# Patient Record
Sex: FEMALE | Race: BLACK OR AFRICAN AMERICAN | NOT HISPANIC OR LATINO | URBAN - METROPOLITAN AREA
[De-identification: names, ages, dates, MRNs, and addresses within clinical notes are randomized per-mention and may not be internally consistent; named-entity substitution may affect disease eponyms.]

---

## 2020-04-07 ENCOUNTER — EMERGENCY (EMERGENCY)
Facility: HOSPITAL | Age: 60
LOS: 1 days | End: 2020-04-07

## 2020-09-23 NOTE — ED ADULT TRIAGE NOTE - MEANS OF ARRIVAL
Worry: Not on file     Inability: Not on file    Transportation needs     Medical: Not on file     Non-medical: Not on file   Tobacco Use    Smoking status: Never Smoker    Smokeless tobacco: Never Used   Substance and Sexual Activity    Alcohol use: No     Alcohol/week: 0.0 standard drinks    Drug use: No    Sexual activity: Never   Lifestyle    Physical activity     Days per week: Not on file     Minutes per session: Not on file    Stress: Not on file   Relationships    Social connections     Talks on phone: Not on file     Gets together: Not on file     Attends [employer] service: Not on file     Active member of club or organization: Not on file     Attends meetings of clubs or organizations: Not on file     Relationship status: Not on file    Intimate partner violence     Fear of current or ex partner: Not on file     Emotionally abused: Not on file     Physically abused: Not on file     Forced sexual activity: Not on file   Other Topics Concern    Not on file   Social History Narrative    Not on file       Family History   Problem Relation Age of Onset    Breast Cancer Mother         triple negative    Diabetes Father     Asthma Brother        Allergies:  Aspirin and Sulfa antibiotics    Home Medications:  Prior to Admission medications    Medication Sig Start Date End Date Taking?  Authorizing Provider   cephALEXin (KEFLEX) 500 MG capsule Take 1 capsule by mouth 4 times daily for 7 days 9/23/20 9/30/20 Yes Chiara Griffin MD   cyclobenzaprine (FLEXERIL) 5 MG tablet Take 1 tablet by mouth 2 times daily as needed for Muscle spasms 9/23/20 10/3/20 Yes Chiara Griffin MD   acetaminophen (TYLENOL) 500 MG tablet Take 2 tablets by mouth 3 times daily for 10 days 9/23/20 10/3/20 Yes Chiara Griffin MD   FEROSUL 325 (65 Fe) MG tablet TAKE 1 TABLET BY MOUTH 2 TIMES DAILY 9/8/20   Sirena Murray MD   pantoprazole (PROTONIX) 40 MG tablet TAKE 1 TABLET BY MOUTH EVERY MORNING (BEFORE BREAKFAST) AS DIRECTED 8/5/20   Efrain Zamudio MD   citalopram (CELEXA) 40 MG tablet TAKE 1 TABLET BY MOUTH ONCE DAILY 8/5/20   Efrain Zamudio MD   albuterol sulfate  (90 Base) MCG/ACT inhaler Take 1 or 2 puffs 8/5/20   Fermin Wilcox MD   pantoprazole (PROTONIX) 40 MG tablet Take 1 tablet before breakfast daily 8/5/20   Fermin Wilcox MD   Blood Pressure KIT 1 kit by Does not apply route once for 1 dose 8/5/20 8/5/20  Fermin Wilcox MD   verapamil (VERELAN) 240 MG extended release capsule Once daily 8/5/20   Fermin Wilcox MD   amitriptyline (ELAVIL) 50 MG tablet Take 1 tablet nightly 8/5/20   Fermin Wilcox MD   rizatriptan (MAXALT) 10 MG tablet TAKE 1 TABLET BY MOUTH ONCE AS NEEDED FOR MIGRAINE MAY REPEAT IN 2 HOURS AS NEEDED 8/5/20   Fermin Wilcox MD   ibuprofen (IBU) 400 MG tablet Take 1 tablet by mouth every 6 hours as needed for Pain 6/10/20   Shirley Nguyen MD   dicyclomine (BENTYL) 10 MG capsule Take 1 capsule by mouth every 6 hours as needed (cramps) 3/16/20   Leland Wade MD   ondansetron (ZOFRAN ODT) 4 MG disintegrating tablet Take 1 tablet by mouth every 4 hours as needed for Nausea 3/16/20   Leland Wade MD   Probiotic Product (PRODIGEN) CAPS TAKE 1 CAPSULE BY MOUTH ONCE DAILY AS DIRECTED 1/30/20   Efrain Zamudio MD       REVIEW OF SYSTEMS    (2-9 systems for level 4, 10 or more for level 5)      Review of Systems   Constitutional: Negative for chills and fever. HENT: Negative for ear pain, hearing loss and sore throat. Eyes: Negative for visual disturbance. Respiratory: Negative for shortness of breath. Cardiovascular: Negative for chest pain. Gastrointestinal: Positive for abdominal pain and vomiting (One episode of throwing up orange juice this morning). Negative for constipation, diarrhea and nausea. Genitourinary: Positive for dysuria and vaginal bleeding (Plan day 2 of her menstrual cycle, no history of menstrual cramps beforehand). Negative for difficulty urinating, vaginal discharge and vaginal pain. Musculoskeletal: Positive for back pain (Low back). Negative for arthralgias and myalgias. Neurological: Negative for numbness. Psychiatric/Behavioral: Negative for agitation and confusion. PHYSICAL EXAM   (up to 7 for level 4, 8 or more for level 5)      INITIAL VITALS:   /79   Pulse 112   Temp 97.2 °F (36.2 °C) (Oral)   Resp 15   Ht 5' 5\" (1.651 m)   Wt 237 lb (107.5 kg)   SpO2 98%   BMI 39.44 kg/m²     Physical Exam  Vitals signs and nursing note reviewed. Constitutional:       General: She is not in acute distress. Appearance: Normal appearance. She is well-developed. She is obese. She is not ill-appearing or diaphoretic. Comments: Preferentially lays on her left side   HENT:      Head: Normocephalic and atraumatic. Right Ear: External ear normal.      Left Ear: External ear normal.      Nose: Nose normal.      Mouth/Throat:      Mouth: Mucous membranes are moist.   Eyes:      Extraocular Movements: Extraocular movements intact. Conjunctiva/sclera: Conjunctivae normal.   Neck:      Musculoskeletal: Normal range of motion and neck supple. Trachea: No tracheal deviation. Cardiovascular:      Rate and Rhythm: Normal rate and regular rhythm. Heart sounds: Normal heart sounds. No murmur. No friction rub. No gallop. Pulmonary:      Effort: Pulmonary effort is normal. No respiratory distress. Breath sounds: Normal breath sounds. No wheezing, rhonchi or rales. Abdominal:      General: There is no distension. Palpations: Abdomen is soft. There is no mass. Tenderness: There is abdominal tenderness (Suprapubic). There is no right CVA tenderness, left CVA tenderness, guarding or rebound. Genitourinary:     Comments: Patient refused pelvic exam  Musculoskeletal: Normal range of motion. General: No swelling, deformity or signs of injury.    Skin:     General: Skin is warm and dry. Capillary Refill: Capillary refill takes less than 2 seconds. Coloration: Skin is not jaundiced. Findings: No bruising or lesion. Neurological:      General: No focal deficit present. Mental Status: She is alert and oriented to person, place, and time. Mental status is at baseline. Motor: No abnormal muscle tone. DIFFERENTIAL  DIAGNOSIS     PLAN (LABS / IMAGING / EKG):  Orders Placed This Encounter   Procedures    Urinalysis with microscopic    PREGNANCY, URINE       MEDICATIONS ORDERED:  Orders Placed This Encounter   Medications    fentaNYL (SUBLIMAZE) injection 50 mcg    ondansetron (ZOFRAN-ODT) disintegrating tablet 4 mg    cephALEXin (KEFLEX) 500 MG capsule     Sig: Take 1 capsule by mouth 4 times daily for 7 days     Dispense:  28 capsule     Refill:  0    cyclobenzaprine (FLEXERIL) 5 MG tablet     Sig: Take 1 tablet by mouth 2 times daily as needed for Muscle spasms     Dispense:  10 tablet     Refill:  0    acetaminophen (TYLENOL) 500 MG tablet     Sig: Take 2 tablets by mouth 3 times daily for 10 days     Dispense:  60 tablet     Refill:  0       DDX: Dary Kelsey is a 35 y.o. female who presents to the emergency department with dysuria and abdominal pain.  Differential diagnosis includes cystitis, UTI, cervicitis, PID, ovarian torsion, ectopic pregnancy    DIAGNOSTIC RESULTS / EMERGENCY DEPARTMENT COURSE / MDM   LAB RESULTS:  Results for orders placed or performed during the hospital encounter of 09/23/20   Urinalysis with microscopic   Result Value Ref Range    Color, UA RED (A) YELLOW    Turbidity UA TURBID (A) CLEAR    Glucose, Ur NEGATIVE NEGATIVE    Bilirubin Urine NEGATIVE  Verified by ictotest. (A) NEGATIVE    Ketones, Urine NEGATIVE NEGATIVE    Specific Gravity, UA 1.028 1.005 - 1.030    Urine Hgb LARGE (A) NEGATIVE    pH, UA >9.0 (H) 5.0 - 8.0    Protein, UA NEGATIVE  Verified by sulfosalicylic acid test. (A) NEGATIVE    Urobilinogen, Urine Normal Normal    Nitrite, Urine POSITIVE (A) NEGATIVE    Leukocyte Esterase, Urine MODERATE (A) NEGATIVE    -          WBC, UA 5 TO 10 0 - 5 /HPF    RBC, UA TOO NUMEROUS TO COUNT 0 - 4 /HPF    Casts UA  0 - 8 /LPF     5 TO 10 HYALINE Reference range defined for non-centrifuged specimen. Crystals, UA NOT REPORTED None /HPF    Epithelial Cells UA 5 TO 10 0 - 5 /HPF    Renal Epithelial, UA NOT REPORTED 0 /HPF    Bacteria, UA NOT REPORTED None    Mucus, UA NOT REPORTED None    Trichomonas, UA NOT REPORTED None    Amorphous, UA NOT REPORTED None    Other Observations UA NOT REPORTED NOT REQ. Yeast, UA NOT REPORTED None   PREGNANCY, URINE   Result Value Ref Range    HCG(Urine) Pregnancy Test NEGATIVE NEGATIVE       IMPRESSION: Omid Edwards is a 35 y.o. female who presents to the emergency department with dysuria and abdominal pain. On examination she is tachycardic but afebrile, vital signs otherwise unremarkable and examination is significant for an otherwise well-appearing woman of stated age with lower abdominal pain and tenderness to palpation. Despite counseling on potential infectious etiology or intra-abdominal pathology patient refuses pelvic exam when offered. Agreeable to urinalysis with urine pregnancy test.  Patient is hopeful to be discharged. Does not wish for empiric treatment of gonorrhea or chlamydia at this time. Plan for urinalysis, urine pregnancy. RADIOLOGY:  No results found. EKG  None    All EKG's are interpreted by the Emergency Department Physician who either signs or co-signs this chart in the absence of a cardiologist.    EMERGENCY DEPARTMENT COURSE:   Imaging and charting reviewed, labs reviewed. Pregnancy test negative but urinalysis diagnostic for UTI. Lack of CVA tenderness makes pyelonephritis less likely. Patient also is very well-appearing and ambulating with steady gait. Did take patient's pulse as she was ambulating and it was 120.   Chart view consideration that the patient might be at risk for infection with the SARS-CoV-2 virus that causes COVID-19. Institutional protocols and algorithms that pertain to the evaluation of patients at risk for COVID-19 are in a state of rapid change based on information released by regulatory bodies including the CDC and federal and state organizations. These policies and algorithms were followed during the patient's care in the ED.     (Please note that portions of thisnote were completed with a voice recognition program.  Efforts were made to edit the dictations but occasionally words are mis-transcribed.)        Juana Dennison MD  Resident  09/23/20 [phone] carried